# Patient Record
(demographics unavailable — no encounter records)

---

## 2025-04-03 NOTE — DISCUSSION/SUMMARY
[FreeTextEntry1] :  He has complaints of 3 weeks of bilateral hand discomfort /weird sensation.  He denies an injury.  He denies numbness or tingling.  I had a discussion with the patient regarding today's visit, the prognosis of this diagnosis, and treatment recommendations and options.  I told him that I am not certain as to the exact etiology of his symptoms.  I recommended taking Motrin or Advil as needed. If his symptoms have not improved in 4-6 weeks, he will return to my office and I may further image him with an MRI or other diagnostic test.  The patient has agreed to the above plan of management and has expressed full understanding.  All questions were fully answered to the patient's satisfaction.  My cumulative time spent on this visit included: Preparation for the visit, review of the medical records, review of pertinent diagnostic studies, examination and counseling of the patient on the above diagnosis, treatment plan and prognosis, orders of diagnostic tests, medication and/or appropriate procedures and documentation in the medical records of today's visit.

## 2025-04-03 NOTE — PHYSICAL EXAM
[de-identified] : - Constitutional: This is a male in no obvious distress.   - Psych: Patient is alert and oriented to person, place and time.  Patient has a normal mood and affect.  - Cardiovascular: Normal pulses throughout the upper extremities.  No significant varicosities are noted in the upper extremities.   ---   Examination of both hands demonstrates no obvious swelling.  There is no evidence of atrophy.  There are no areas of tenderness.  Full flexion and extension of the digits.  There is no evidence of a trigger finger.  Provocative signs for carpal tunnel syndrome are negative bilaterally.  He has intact sensation to light touch distally bilaterally along the radial, ulnar and median nerve distributions.            [de-identified] : AP, lateral, and oblique radiographs of the bilateral hands demonstrate widening of the scapholunate interval on the right side with mild DRUJ arthritis, mild right thumb MCP joint arthritis.

## 2025-04-03 NOTE — CONSULT LETTER
[Dear  ___] : Dear  [unfilled], [Consult Letter:] : I had the pleasure of evaluating your patient, [unfilled]. [Please see my note below.] : Please see my note below. [Consult Closing:] : Thank you very much for allowing me to participate in the care of this patient.  If you have any questions, please do not hesitate to contact me. [Sincerely,] : Sincerely, [FreeTextEntry3] : Alok Gonzalez M.D. Surgery of the Hand & Upper Extremity Orthopaedic Surgery Chief, Hand Service, [facility]  of Orthopedic [facility], [facility] [employer] [facility] at [facility]Email: [email or web address] Office Phone: [phone]

## 2025-04-03 NOTE — HISTORY OF PRESENT ILLNESS
[Right] : right hand dominant [FreeTextEntry1] : He comes in today for evaluation of abnormal bilateral hand sensation that began about 3 weeks ago with no known injury. He reports the sensation occurs hourly and requires him rubbing his hands together in order to stop the sensation.  He is a retired .   He was referred by Dr. Stallings.

## 2025-04-03 NOTE — END OF VISIT
[FreeTextEntry3] : This note was written by Tray Johnston on 04/03/2025 acting solely as a scribe for Dr. Alok Gonzalez.   All medical record entries made by the Scribe were at my, Dr. Alok Gonzalez, direction and personally dictated by me on 04/03/2025. I have personally reviewed the chart and agree that the record accurately reflects my personal performance of the history, physical exam, assessment and plan.

## 2025-04-03 NOTE — ADDENDUM
[FreeTextEntry1] :  I, Tray Johnston, acted solely as a scribe for Dr. Gonzalez on this date on 04/03/2025.